# Patient Record
Sex: FEMALE
[De-identification: names, ages, dates, MRNs, and addresses within clinical notes are randomized per-mention and may not be internally consistent; named-entity substitution may affect disease eponyms.]

---
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
## 2024-09-05 ENCOUNTER — HOSPITAL ENCOUNTER (EMERGENCY)
Dept: HOSPITAL 88 - ER | Age: 19
LOS: 1 days | Discharge: HOME | End: 2024-09-06
Payer: SELF-PAY

## 2024-09-05 VITALS — HEIGHT: 59 IN | BODY MASS INDEX: 28.22 KG/M2 | WEIGHT: 140 LBS

## 2024-09-05 VITALS — TEMPERATURE: 98.8 F | HEART RATE: 68 BPM | OXYGEN SATURATION: 100 %
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
## 2024-09-05 DIAGNOSIS — M25.512: Primary | ICD-10-CM

## 2024-09-05 PROCEDURE — 99283 EMERGENCY DEPT VISIT LOW MDM: CPT
